# Patient Record
Sex: MALE | Race: WHITE | URBAN - METROPOLITAN AREA
[De-identification: names, ages, dates, MRNs, and addresses within clinical notes are randomized per-mention and may not be internally consistent; named-entity substitution may affect disease eponyms.]

---

## 2017-01-20 ENCOUNTER — OFFICE VISIT (OUTPATIENT)
Dept: UROLOGY | Facility: CLINIC | Age: 37
End: 2017-01-20

## 2017-01-20 VITALS
BODY MASS INDEX: 40.14 KG/M2 | SYSTOLIC BLOOD PRESSURE: 151 MMHG | DIASTOLIC BLOOD PRESSURE: 104 MMHG | HEART RATE: 85 BPM | HEIGHT: 69 IN | WEIGHT: 271 LBS

## 2017-01-20 DIAGNOSIS — R86.8 ASTHENOSPERMIA: ICD-10-CM

## 2017-01-20 DIAGNOSIS — N46.11 OLIGOSPERMIA: Primary | ICD-10-CM

## 2017-01-20 DIAGNOSIS — Z87.438 H/O UNDESCENDED TESTICLE: ICD-10-CM

## 2017-01-20 DIAGNOSIS — R86.8 TERATOSPERMIA: ICD-10-CM

## 2017-01-20 LAB — FSH SERPL-ACNC: 6.5 IU/L (ref 0.7–10.8)

## 2017-01-20 ASSESSMENT — PAIN SCALES - GENERAL: PAINLEVEL: NO PAIN (0)

## 2017-01-20 NOTE — PROGRESS NOTES
Dear Dr. Delgadillo, it was my pleasure to see Mr. Jerry Jackson, a 36 year old male here in consultation today for fertility evaluation.  His spouse is Letty Jamison age 39 ([birth year]77).    This couple has been attempting to conceive for the last 9 mos. They have no previous pregnancy together.  Pregnancies with other partners: she has 12 yo and 18yo sons with another partner.  They have  tried timed intercourse. They have not tried IUI or IVF.    Female factors suspected: advanced age.  Cycles are irregular.  BBTs are normal, + OPK's.    PAST MEDICAL HISTORY:    No chronic medical problems   Puberty normal   No associated conditions such as ED or sexual dysfunction.  No  problems.     PAST SURG HISTORY  Knee surgery  As baby had undescended testis on right, had orchidopexy, possible bilateral undescended testes.    Medications as of 1/20/2017:  No prescription medications     ALLERGY:   No Known Allergies    SOCIAL HISTORY:   . Occupation: .  No alcohol abuse, no tobacco use.   Social History   Substance Use Topics     Smoking status: Current Every Day Smoker -- 0.50 packs/day for 4 years     Types: Cigarettes     Smokeless tobacco: Not on file      Comment: fomer cigar smoker, current e-cig smoker     Alcohol Use: 0.0 oz/week     0 Standard drinks or equivalent per week      Comment: rare   heavier drinking in his 20's      FAMILY HISTORY: No inherited disorders.     Family History   Problem Relation Age of Onset     Coronary Artery Disease Mother        REVIEW OF SYSTEMS:  Significant for feeling well at present .    Denies erectile dysfunction, ejaculatory problems, testicular pain. No vision or smell deficits, no chronic sinus or respiratory infections. No recent febrile illness, weight loss. No heat or cold intolerance, gynecomastia, or other endocrine complaints.    Otherwise, no constitutional, eye, ENT, heart, lung, GI , musculoskeletal, skin, neurologic, psychiatric, or hematologic  "complaints.    GONADOTOXIN EXPOSURE: + for smoking. Otherwise negative for marijuana, heat, chemicals, pesticides, heavy metals, steroids, chemotherapy or radiation.    GENERAL PHYSICAL EXAM  /104 mmHg  Pulse 85  Ht 1.753 m (5' 9\")  Wt 122.925 kg (271 lb)  BMI 40.00 kg/m2   Constitutional: No acute distress. Well nourished.   PSYCH: normal mood and affect.  NEURO: normal gait, no focal deficits.   EYES: anicteric, EOMI, PERR  ENT: neck supple,  mucosae moist, no thrush.  CARDIOPULMONARY: breathing non-labored, pulse regular, no peripheral edema.  GI: Abdomen soft, non-tender, no bilateral inguinal orchiopexy surgical scars, no organomegaly.  MUSCULOSKELETAL: normal limb proportions, no muscle wasting, no contractures.  SKIN: Normal virilized hair distribution, no lesions, warts or rashes over genitalia, abdomen extremities or face.  HEME/LYMPH: no ecchymosis, no lymphadenopathy in groin or neck, no lymphedema.     EXAM:  Phallus circumcised, meatus adequate, no plaques palpated.   Left testis descended , size is 18-20 cc , consistency is normal . No intra-testicular masses.   Right testis descended , size is 18-20 cc , consistency is normal . No intra-testicular masses.   Epididymes present, non-tender, not enlarged.   Left cord: Vas present. no varicocele noted.  Right cord: Vas present. no varicocele noted.   Bilateral inguinal hernia repair scars noted.  Rectal exam deferred.     Semen Analysis 12/5/16:  4.1ml,  pH 7.4, 6.8M/cc, 51% Motile, 12% morphology (>15%), Total Motile Count: 14.2 Million.     Semen Analysis 12/9/16:  5.2ml,  pH 7.2, 2.7M/cc, 37% Motile, 5% morphology (>15%), Total Motile Count: 5.19 Million.       ASSESSMENT:    testis hypofunction: oligo-asthenoteratoospermia.     No obvious varicocele on exam.    Advanced female age    History of bilateral orchidopexy/ bilateral undescended testes.    PLAN:    Hormonal panel ordered today to evaluate for empirical therapy options.    Lower " semen parameters likely due to history of undescended testes.    Semen analysis x 2 done and results reviewed in depth    Recommended they consider IUI or IVF    Thank-you for allowing me to care for your patient.  Sincerely,    Singh Lo MD      CC: Elie

## 2017-01-20 NOTE — PATIENT INSTRUCTIONS
Complete lab work and Dr Lo will contact you with results    It was a pleasure meeting with you today.  Thank you for allowing me and my team the privilege of caring for you today.  YOU are the reason we are here, and I truly hope we provided you with the excellent service you deserve.  Please let us know if there is anything else we can do for you so that we can be sure you are leaving completely satisfied with your care experience.

## 2017-01-20 NOTE — Clinical Note
1/20/2017       RE: Jerry Jackson  [address]   [facility] [address]     Dear Colleague,    Thank you for referring your patient, Jerry Jackson, to the [facility] UR[facility] AND [facility] PROSTATE AND UROLOGIC CANCERS at [facility]. Please see a copy of my visit note below.    Dear Dr. Delgadillo, it was my pleasure to see Mr. Jerry Jacskon, a 36 year old male here in consultation today for fertility evaluation.  His spouse is Letty Jamison age 39 ([birth year]/77).    This couple has been attempting to conceive for the last 9 mos. They have no previous pregnancy together.  Pregnancies with other partners: she has 10 yo and 20yo sons with another partner.  They have  tried timed intercourse. They have not tried IUI or IVF.    Female factors suspected: advanced age.  Cycles are irregular.  BBTs are normal, + OPK's.    PAST MEDICAL HISTORY:    No chronic medical problems   Puberty normal   No associated conditions such as ED or sexual dysfunction.  No  problems.     PAST SURG HISTORY  Knee surgery  As baby had undescended testis on right, had orchidopexy, possible bilateral undescended testes.    Medications as of 1/20/2017:  No prescription medications     ALLERGY:   No Known Allergies    SOCIAL HISTORY:   . Occupation: .  No alcohol abuse, no tobacco use.   Social History   Substance Use Topics     Smoking status: Current Every Day Smoker -- 0.50 packs/day for 4 years     Types: Cigarettes     Smokeless tobacco: Not on file      Comment: fomer cigar smoker, current e-cig smoker     Alcohol Use: 0.0 oz/week     0 Standard drinks or equivalent per week      Comment: rare   heavier drinking in his 20's      FAMILY HISTORY: No inherited disorders.     Family History   Problem Relation Age of Onset     Coronary Artery Disease Mother        REVIEW OF SYSTEMS:  Significant for feeling well at present .    Denies erectile dysfunction, ejaculatory problems, testicular  "pain. No vision or smell deficits, no chronic sinus or respiratory infections. No recent febrile illness, weight loss. No heat or cold intolerance, gynecomastia, or other endocrine complaints.    Otherwise, no constitutional, eye, ENT, heart, lung, GI , musculoskeletal, skin, neurologic, psychiatric, or hematologic complaints.    GONADOTOXIN EXPOSURE: + for smoking. Otherwise negative for marijuana, heat, chemicals, pesticides, heavy metals, steroids, chemotherapy or radiation.    GENERAL PHYSICAL EXAM  /104 mmHg  Pulse 85  Ht 1.753 m (5' 9\")  Wt 122.925 kg (271 lb)  BMI 40.00 kg/m2   Constitutional: No acute distress. Well nourished.   PSYCH: normal mood and affect.  NEURO: normal gait, no focal deficits.   EYES: anicteric, EOMI, PERR  ENT: neck supple,  mucosae moist, no thrush.  CARDIOPULMONARY: breathing non-labored, pulse regular, no peripheral edema.  GI: Abdomen soft, non-tender, no bilateral inguinal orchiopexy surgical scars, no organomegaly.  MUSCULOSKELETAL: normal limb proportions, no muscle wasting, no contractures.  SKIN: Normal virilized hair distribution, no lesions, warts or rashes over genitalia, abdomen extremities or face.  HEME/LYMPH: no ecchymosis, no lymphadenopathy in groin or neck, no lymphedema.     EXAM:  Phallus circumcised, meatus adequate, no plaques palpated.   Left testis descended , size is 18-20 cc , consistency is normal . No intra-testicular masses.   Right testis descended , size is 18-20 cc , consistency is normal . No intra-testicular masses.   Epididymes present, non-tender, not enlarged.   Left cord: Vas present. no varicocele noted.  Right cord: Vas present. no varicocele noted.   Bilateral inguinal hernia repair scars noted.  Rectal exam deferred.     Semen Analysis 12/5/16:  4.1ml,  pH 7.4, 6.8M/cc, 51% Motile, 12% morphology (>15%), Total Motile Count: 14.2 Million.     Semen Analysis 12/9/16:  5.2ml,  pH 7.2, 2.7M/cc, 37% Motile, 5% morphology (>15%), Total " Motile Count: 5.19 Million.       ASSESSMENT:    testis hypofunction: oligo-asthenoteratoospermia.     No obvious varicocele on exam.    Advanced female age    History of bilateral orchidopexy/ bilateral undescended testes.    PLAN:    Hormonal panel ordered today to evaluate for empirical therapy options.    Lower semen parameters likely due to history of undescended testes.    Semen analysis x 2 done and results reviewed in depth    Recommended they consider IUI or IVF    Thank-you for allowing me to care for your patient.  Sincerely,    Singh Lo MD      CC: Elie

## 2017-01-23 PROBLEM — Z87.438 H/O UNDESCENDED TESTICLE: Status: ACTIVE | Noted: 2017-01-23

## 2017-01-23 PROBLEM — N46.11 OLIGOSPERMIA: Status: ACTIVE | Noted: 2017-01-23

## 2017-01-23 LAB
SHBG SERPL-SCNC: 22 NMOL/L (ref 11–80)
TESTOST FREE SERPL-MCNC: 4.49 NG/DL (ref 4.7–24.4)
TESTOST SERPL-MCNC: 191 NG/DL (ref 240–950)

## 2017-01-25 LAB — ESTRADIOL SERPL HS-MCNC: 17 PG/ML (ref 10–40)

## 2017-01-27 NOTE — PROGRESS NOTES
Quick Note:    Need AM labs for testosterone, Lutropin, prolactin and ferritin please.    Thank You  EVELIN    Dear Jerry -  Testosterone is a little low  We'll get some follow-up labs to see why this is the case.   I'll have my RN care coordinator Martha contact you to arrange follow-up.  There is a normal testosterone to estrogen ratio.  FSH is the signal from the brain to the testicles to drive sperm production. Your FSH level is normal; I prefer to see this under 7 or so. Higher FSH can indicate decreased sperm production ability in the testes, and the brain raises the FSH to try to drive more sperm production. So it's good to have a normal FSH.    Thank You   Let me know if you have any questions.    Elinor MCCRARY  ______

## 2017-01-31 DIAGNOSIS — N46.11 OLIGOSPERMIA: Primary | ICD-10-CM

## 2017-02-04 DIAGNOSIS — N46.11 OLIGOSPERMIA: ICD-10-CM

## 2017-02-04 LAB
FERRITIN SERPL-MCNC: 248 NG/ML (ref 26–388)
LH SERPL-ACNC: 5.4 IU/L (ref 1.5–9.3)
PROLACTIN SERPL-MCNC: 9 UG/L (ref 2–18)

## 2017-02-07 LAB — TESTOST SERPL-MCNC: 282 NG/DL (ref 240–950)

## 2017-02-09 DIAGNOSIS — N46.11 OLIGOSPERMIA: Primary | ICD-10-CM

## 2017-02-09 RX ORDER — CLOMIPHENE CITRATE 50 MG/1
TABLET ORAL
Qty: 12 TABLET | Refills: 6 | Status: SHIPPED | OUTPATIENT
Start: 2017-02-09

## 2017-02-09 NOTE — PROGRESS NOTES
Quick Note:    Dear Jerry,   Here are your recent results.     Repeat testosterone level was normal.  Lutropin, prolactin and ferritin are proteins/hormones that help regulate testosterone level, these are all normal also.  So, no major concerns.    An option from the male side is to use a prescription medication like Clomid to fine tune your hormones some, and hopefully improve sperm production. Clomid is marketed as a female fertility medication but is used commonly and safely for treating men as well. Clomid helps stimulate the testicle to try to improve sperm production. Results are variable and take at least 3-4 months on the medication to see possible improvement in semen analysis parameters, due to the slow rate of sperm production. Side effects are uncommon but can include decreased libido, breast tenderness, or water weight gain.     Let me know if you have any questions/ if you want to try medication.    Singh Lo MD  ______

## 2017-08-16 DIAGNOSIS — N46.11 OLIGOSPERMIA: Primary | ICD-10-CM

## 2017-08-16 LAB
ABSTINENCE DAYS: 3 DAYS (ref 2–7)
AGGLUTINATION: NO YES/NO
ANALYSIS TEMP - CENTIGRADE: 23 CENTIGRADE
CELL FRAGMENTS: ABNORMAL %
COLLECTION METHOD: ABNORMAL
COLLECTION SITE: ABNORMAL
CONSENT TO RELEASE TO PARTNER: YES
IMMATURE SPERM: ABNORMAL %
IMMOTILE: 44 %
LAB RECEIPT TIME: ABNORMAL
LIQUEFIED: YES YES/NO
NON-PROGRESSIVE MOTILITY: 12 %
PROGRESSIVE MOTILITY: 44 % (ref 32–?)
ROUND CELLS: 0.4 MILLION/ML (ref ?–2)
SPECIMEN CONCENTRATION: 0.5 MILLION/ML (ref 15–?)
SPECIMEN PH: 7.6 PH (ref 7.2–?)
SPECIMEN TYPE: ABNORMAL
SPECIMEN VOL UR: 3.7 ML (ref 1.5–?)
TIME OF ANALYSIS: ABNORMAL
TOTAL NUMBER: 1.9 MILLION (ref 39–?)
TOTAL PROGRESSIVE MOTILE: 0.8 MILLION (ref 15.6–?)
VISCOUS: NO YES/NO
VITALITY: ABNORMAL % (ref 58–?)
WBC SPECIMEN: ABNORMAL %

## 2017-08-16 PROCEDURE — 89320 SEMEN ANAL VOL/COUNT/MOT: CPT

## 2020-02-16 ENCOUNTER — HEALTH MAINTENANCE LETTER (OUTPATIENT)
Age: 40
End: 2020-02-16

## 2020-11-22 ENCOUNTER — HEALTH MAINTENANCE LETTER (OUTPATIENT)
Age: 40
End: 2020-11-22

## 2021-04-04 ENCOUNTER — HEALTH MAINTENANCE LETTER (OUTPATIENT)
Age: 41
End: 2021-04-04

## 2021-09-18 ENCOUNTER — HEALTH MAINTENANCE LETTER (OUTPATIENT)
Age: 41
End: 2021-09-18

## 2022-04-30 ENCOUNTER — HEALTH MAINTENANCE LETTER (OUTPATIENT)
Age: 42
End: 2022-04-30

## 2022-11-20 ENCOUNTER — HEALTH MAINTENANCE LETTER (OUTPATIENT)
Age: 42
End: 2022-11-20

## 2023-06-02 ENCOUNTER — HEALTH MAINTENANCE LETTER (OUTPATIENT)
Age: 43
End: 2023-06-02